# Patient Record
Sex: FEMALE | Race: WHITE | Employment: FULL TIME | URBAN - METROPOLITAN AREA
[De-identification: names, ages, dates, MRNs, and addresses within clinical notes are randomized per-mention and may not be internally consistent; named-entity substitution may affect disease eponyms.]

---

## 2018-04-19 PROCEDURE — 88175 CYTOPATH C/V AUTO FLUID REDO: CPT | Performed by: OBSTETRICS & GYNECOLOGY

## 2018-07-06 ENCOUNTER — OFFICE VISIT (OUTPATIENT)
Dept: INTERNAL MEDICINE CLINIC | Facility: CLINIC | Age: 38
End: 2018-07-06

## 2018-07-06 VITALS
BODY MASS INDEX: 23.5 KG/M2 | DIASTOLIC BLOOD PRESSURE: 66 MMHG | HEART RATE: 86 BPM | HEIGHT: 66.5 IN | TEMPERATURE: 98 F | SYSTOLIC BLOOD PRESSURE: 102 MMHG | WEIGHT: 148 LBS | OXYGEN SATURATION: 98 %

## 2018-07-06 DIAGNOSIS — Z00.00 ANNUAL PHYSICAL EXAM: Primary | ICD-10-CM

## 2018-07-06 DIAGNOSIS — L71.0 PERIORAL DERMATITIS: ICD-10-CM

## 2018-07-06 DIAGNOSIS — R09.81 NASAL CONGESTION: ICD-10-CM

## 2018-07-06 DIAGNOSIS — E65 AXILLARY FAT PAD: ICD-10-CM

## 2018-07-06 PROCEDURE — 99385 PREV VISIT NEW AGE 18-39: CPT | Performed by: INTERNAL MEDICINE

## 2018-07-06 NOTE — PROGRESS NOTES
Spencer Stein is a 41year old female. Patient presents with:  Establish Care: See's gyne for paps last done 4/18/18 with normal result Dr. Emelia Avila had periguard removed today on preventative ABX. Had mirena inserted today.  No former pcp, see's derm for Smokeless tobacco: Never Used                             REVIEW OF SYSTEMS:   GENERAL: feels well otherwise  SKIN: denies any unusual skin lesions  EYES:denies blurred vision or double vision  HEENT: denies nasal congestion, sinus pain, ST, so involved in excision, this could lead to lymphedema, but if it is just excess skin, may be ok do to this. 4. Nasal congestion  Trial of flonase to see if this helps.        Josue Melton DO  7/6/2018  3:25 PM

## 2018-08-13 ENCOUNTER — OFFICE VISIT (OUTPATIENT)
Dept: INTERNAL MEDICINE CLINIC | Facility: CLINIC | Age: 38
End: 2018-08-13
Payer: COMMERCIAL

## 2018-08-13 VITALS
HEIGHT: 66.5 IN | SYSTOLIC BLOOD PRESSURE: 116 MMHG | BODY MASS INDEX: 23.5 KG/M2 | TEMPERATURE: 99 F | WEIGHT: 148 LBS | DIASTOLIC BLOOD PRESSURE: 65 MMHG | OXYGEN SATURATION: 98 % | HEART RATE: 86 BPM

## 2018-08-13 DIAGNOSIS — L03.116 CELLULITIS OF LEFT LOWER EXTREMITY: Primary | ICD-10-CM

## 2018-08-13 PROCEDURE — 99213 OFFICE O/P EST LOW 20 MIN: CPT | Performed by: INTERNAL MEDICINE

## 2018-08-13 PROCEDURE — 99212 OFFICE O/P EST SF 10 MIN: CPT | Performed by: INTERNAL MEDICINE

## 2018-08-13 RX ORDER — DOXYCYCLINE HYCLATE 100 MG/1
100 CAPSULE ORAL 2 TIMES DAILY
Qty: 14 CAPSULE | Refills: 0 | Status: SHIPPED | OUTPATIENT
Start: 2018-08-13 | End: 2019-09-11

## 2018-08-13 NOTE — PROGRESS NOTES
Elfego Merino is a 41year old female. Patient presents with:  Bite Sting,Insect (integumentary): Patient states she got stung by a bee on 08/05/18.       HPI:   Elfego Merino is a 41year old female who presents for: bite/sting    She was stun BMI 23.53 kg/m²     GENERAL: well developed, well nourished, in no apparent distress  Posterior L leg with 3cm area of flat erythematous patch, no blisters, open pore, or induration noted. ASSESSMENT AND PLAN:     1.  Cellulitis of left lower extremity

## 2019-06-05 PROCEDURE — 81003 URINALYSIS AUTO W/O SCOPE: CPT | Performed by: OBSTETRICS & GYNECOLOGY

## 2019-06-05 PROCEDURE — 88175 CYTOPATH C/V AUTO FLUID REDO: CPT | Performed by: OBSTETRICS & GYNECOLOGY

## 2019-09-11 NOTE — PROGRESS NOTES
Tamara Kaminski is a 42year old female. Patient presents with: Anxiety      HPI:   Tamara Kaminski is a 42year old female who presents for: anxiety    Reports worsening anxiety with work.  Reports feeling nervous and an ache in her stomach prior 110/72   Pulse 64   Temp 98.2 °F (36.8 °C) (Oral)   Ht 5' 6\" (1.676 m)   Wt 154 lb (69.9 kg)   BMI 24.86 kg/m²     GENERAL: well developed, well nourished, in no apparent distress  Good eye contact, well groomed. ASSESSMENT AND PLAN:     1.  Anxiety

## 2019-10-07 ENCOUNTER — PATIENT MESSAGE (OUTPATIENT)
Dept: INTERNAL MEDICINE CLINIC | Facility: CLINIC | Age: 39
End: 2019-10-07

## 2019-10-07 NOTE — TELEPHONE ENCOUNTER
From: Jin Cortez  To: Breezy Colmenares DO  Sent: 10/7/2019 9:03 AM CDT  Subject: Visit Faina Gillette,    I went in for a visit last month to chat about feelings of a lack of kenisha, anxiety, etc. You had suggested a half hour every d

## 2019-10-08 RX ORDER — ESCITALOPRAM OXALATE 5 MG/1
5 TABLET ORAL DAILY
Qty: 30 TABLET | Refills: 1 | Status: SHIPPED | OUTPATIENT
Start: 2019-10-08 | End: 2020-09-11

## 2020-09-10 ENCOUNTER — PATIENT MESSAGE (OUTPATIENT)
Dept: INTERNAL MEDICINE CLINIC | Facility: CLINIC | Age: 40
End: 2020-09-10

## 2020-09-10 NOTE — TELEPHONE ENCOUNTER
From: Sarah Noriega  To: Alex Hernadez DO  Sent: 9/10/2020 1:47 PM CDT  Subject: Non-Urgent Medical Question    Attaching another image to go along with my message from earlier this morning.  This is a closer up shot of some of the smaller satellite bu

## 2020-09-11 ENCOUNTER — OFFICE VISIT (OUTPATIENT)
Dept: INTERNAL MEDICINE CLINIC | Facility: CLINIC | Age: 40
End: 2020-09-11
Payer: COMMERCIAL

## 2020-09-11 VITALS
OXYGEN SATURATION: 98 % | RESPIRATION RATE: 14 BRPM | SYSTOLIC BLOOD PRESSURE: 118 MMHG | HEART RATE: 87 BPM | TEMPERATURE: 97 F | DIASTOLIC BLOOD PRESSURE: 78 MMHG | WEIGHT: 155 LBS | BODY MASS INDEX: 25 KG/M2

## 2020-09-11 DIAGNOSIS — B02.9 HERPES ZOSTER WITHOUT COMPLICATION: Primary | ICD-10-CM

## 2020-09-11 PROCEDURE — 3078F DIAST BP <80 MM HG: CPT | Performed by: INTERNAL MEDICINE

## 2020-09-11 PROCEDURE — 3074F SYST BP LT 130 MM HG: CPT | Performed by: INTERNAL MEDICINE

## 2020-09-11 PROCEDURE — 99214 OFFICE O/P EST MOD 30 MIN: CPT | Performed by: INTERNAL MEDICINE

## 2020-09-11 RX ORDER — GABAPENTIN 100 MG/1
100 CAPSULE ORAL NIGHTLY
Qty: 20 CAPSULE | Refills: 0 | Status: SHIPPED | OUTPATIENT
Start: 2020-09-11 | End: 2021-07-30

## 2020-09-11 RX ORDER — VALACYCLOVIR HYDROCHLORIDE 1 G/1
1 TABLET, FILM COATED ORAL 3 TIMES DAILY
Qty: 21 TABLET | Refills: 0 | Status: SHIPPED | OUTPATIENT
Start: 2020-09-11 | End: 2020-09-18

## 2020-09-11 NOTE — PATIENT INSTRUCTIONS
1. Herpes zoster without complication  For the shingles I recommend that we take the Valtrex 3 times a day for 7 days, complete the whole medication regimen here  Remember the precautions on coming in contact with others  Think about keeping the larger pat

## 2020-09-11 NOTE — TELEPHONE ENCOUNTER
Spoke to patient and relayed MD message. Patient verbalized understanding.    Appointment made with you (earliest appt tomorrow among physicians) at 9:30AM.    When screening patient, she mentioned her daughter has a fever and was tested for COVID today, th

## 2020-09-11 NOTE — TELEPHONE ENCOUNTER
Nursing, can you call her, let her know I did send her a my chart message covering for Dr. Shannen Gillette, we would have to see this type of thing in person in order to make a determination on how to treat it if it is getting worse, progressing for sure she should b

## 2020-09-11 NOTE — TELEPHONE ENCOUNTER
As long as she is screened at the front door, you may have to warn her what to expect with the questioning.   When she is asked has she been exposed anyone COVID's, she must say no, pending testing does not count

## 2020-09-11 NOTE — PROGRESS NOTES
HPI:   Joelle York is a 47year old female who presents for complains of: Patient presents with:  Rash: Pt c/o rash on R abdomen that is itchy and burning. Reports sensitive to touch. Denies exposure to poison ivy.  Pt has not tried OTC medications capsule (100 mg total) by mouth nightly. Dispense: 20 capsule;  Refill: [address] [place] [address] [address], DO  9/11/2020  10:07 AM

## 2020-11-16 ENCOUNTER — TELEPHONE (OUTPATIENT)
Dept: INTERNAL MEDICINE CLINIC | Facility: CLINIC | Age: 40
End: 2020-11-16

## 2020-11-16 DIAGNOSIS — R52 BODY ACHES: Primary | ICD-10-CM

## 2020-11-16 NOTE — TELEPHONE ENCOUNTER
Respiratory infection triage:    Fever:  [x]  No fever 98.0ish  []  Fever>100.4    Cough:  [] Tight cough  [] Cough with exertion  [] Dry cough  [] Sputum production, Color:     Breathing:  [] Mild shortness of breath interfering with activity  [] Wheezing  [] Pain with deep breathing  [] Using inhaler    Other symptoms:  [] Sore throat  [] Difficulty swallowing  [x] Nasal drainage/congestion \"post nasal drip\"  [] Sinus congestion/pressure  [] Ear pain  [x] Body aches  [x] Poor appetite  [] Loss of sense of smell   [] Loss of sense of taste  []Conjunctivitis? [] Any recent travel? [] Any sick contacts? [] Are you a healthcare worker? ADDITIONAL NOTES:    Sx onset: last night, worse today. Denies sick contacts or covid exposure. She had family over on Saturday, they spent the night. No mask worn, but no one was or is symptomatic. To Dr. Stephani Cervantes for continuity of care. Please advise. Notified patient that we will route this message to the doctor and see what their recommendations would be. In the meantime, if anything worsens, they were advised to call back or seek emergent evaluation.

## 2020-11-16 NOTE — TELEPHONE ENCOUNTER
Please call pt  Requesting order for COVID test  Pt is achy, tired, chills, no fever  Pt not aware of any exposure  Tasked to nursing

## 2020-11-17 NOTE — TELEPHONE ENCOUNTER
Nursing okay to give her the number for central scheduling to try to set this up as soon as she can and we will call her with results. If she truly think she could have Covid, she is supposed to quarantine until her results are received.

## 2020-11-17 NOTE — TELEPHONE ENCOUNTER
Dr. Mike Navarrete message and Central Scheduling phone number relayed on pt's VM per [facility].

## 2020-11-18 ENCOUNTER — APPOINTMENT (OUTPATIENT)
Dept: LAB | Age: 40
End: 2020-11-18
Attending: INTERNAL MEDICINE
Payer: COMMERCIAL

## 2020-11-18 DIAGNOSIS — R52 BODY ACHES: ICD-10-CM

## 2021-03-12 ENCOUNTER — OFFICE VISIT (OUTPATIENT)
Dept: FAMILY MEDICINE CLINIC | Facility: CLINIC | Age: 41
End: 2021-03-12
Payer: COMMERCIAL

## 2021-03-12 VITALS
TEMPERATURE: 99 F | DIASTOLIC BLOOD PRESSURE: 79 MMHG | HEART RATE: 73 BPM | OXYGEN SATURATION: 98 % | SYSTOLIC BLOOD PRESSURE: 132 MMHG | RESPIRATION RATE: 18 BRPM

## 2021-03-12 DIAGNOSIS — Z20.822 SUSPECTED COVID-19 VIRUS INFECTION: Primary | ICD-10-CM

## 2021-03-12 DIAGNOSIS — Z20.822 CLOSE EXPOSURE TO COVID-19 VIRUS: ICD-10-CM

## 2021-03-12 PROCEDURE — 99202 OFFICE O/P NEW SF 15 MIN: CPT | Performed by: NURSE PRACTITIONER

## 2021-03-12 PROCEDURE — 3078F DIAST BP <80 MM HG: CPT | Performed by: NURSE PRACTITIONER

## 2021-03-12 PROCEDURE — 3075F SYST BP GE 130 - 139MM HG: CPT | Performed by: NURSE PRACTITIONER

## 2021-03-12 NOTE — PROGRESS NOTES
CHIEF COMPLAINT:   Patient presents with:  Covid:  tested positive for covid.   I've had sinus symptoms for 2 days. - Entered by patient      HPI:   Tamika Alston is a 47year old female who presents with symptoms as described below for 2 days Social History    Tobacco Use      Smoking status: Never Smoker      Smokeless tobacco: Never Used    Vaping Use      Vaping Use: Never used    Alcohol use: Yes      Comment: 12 drinks a week    Drug use: No        REVIEW OF SYSTEMS:   GENERAL: good appe not already completed. Advised to self quarantine at home while awaiting result. CDC quarantine recommendations reviewed. Advised a negative COVID test does not guarantee pt is not infectious or contagious. Notify employer/school of testing. for preventing spread of the virus, and managing symptoms. If you think you have COVID-19 symptoms  · Stay home. Call your healthcare provider and tell them you have symptoms of COVID-19. Do this before going to any hospital or clinic.  Follow your provide need to get medical care. Don't go to work, school, or public areas. Don't use public transportation or taxis. · Follow all instructions from your healthcare provider. Call your healthcare provider’s office before going.  They can prepare and give you inst hydrated. Drinking liquids is the best way to prevent dehydration. . Try to drink 6 to 8 glasses of liquids every day, or as advised by your provider. Also check with your provider about which fluids are best for you.  Don't drink fluids that contain caffein blankets. · Clean fabrics and laundry thoroughly. · Keep other people and pets away from the sick person. When you can stop self-isolation  When you are sick with COVID-19, you should stay away from other people. This is called self-isolation.   If you these:  · Trouble breathing  · Pain or pressure in chest  If a sick person has any of these, call 911:  · Trouble breathing that gets worse  · Pain or pressure in chest that gets worse  · Blue tint to lips or face  · Fast or irregular heartbeat  · Confusio

## 2021-03-12 NOTE — PATIENT INSTRUCTIONS
• Covid hotline number is [phone]  • Results for covid testing can vary from 1-2 days  • If you have not received results by 2 days please contact the clinic  • Notify your employer or school of testing  • Remember if you tested negative but were expo mask of your own. You can do this using a bandana, T-shirt, or other cloth. The CDC has instructions on how to make a mask. Wear the mask so that it covers both your nose and mouth.   · If you need to go to a hospital or clinic, expect that the healthcare s like eating and drinking utensils, towels, and bedding. · If you need to cough or sneeze, do it into a tissue. Then throw the tissue into the trash. If you don't have tissues, cough or sneeze into the bend of your elbow. · Wash your hands often.     Self- asked the [employer] to help with plasma donation and collection. Caring for a sick person   · Follow all instructions from healthcare staff. · Wash your hands often. · Wear protective clothing as advised.   · Make sure the sick person wears a ma system disorders. Follow your healthcare provider's instructions on how to isolate and when it's OK to stop. You likely will be told to stay in home isolation until all 3 of these are true:  1. You have no fever without fever-reducing medicines.   2. Your b

## 2021-03-13 LAB — SARS-COV-2 RNA RESP QL NAA+PROBE: DETECTED

## 2021-03-17 ENCOUNTER — TELEPHONE (OUTPATIENT)
Dept: INTERNAL MEDICINE CLINIC | Facility: CLINIC | Age: 41
End: 2021-03-17

## 2021-05-03 ENCOUNTER — TELEPHONE (OUTPATIENT)
Dept: INTERNAL MEDICINE CLINIC | Facility: CLINIC | Age: 41
End: 2021-05-03

## 2021-05-03 NOTE — TELEPHONE ENCOUNTER
LMOM for pt. To call back and schedule her annual [facility] with Dr. Aleah Gore.

## 2021-11-24 ENCOUNTER — OFFICE VISIT (OUTPATIENT)
Dept: INTERNAL MEDICINE CLINIC | Facility: CLINIC | Age: 41
End: 2021-11-24
Payer: COMMERCIAL

## 2021-11-24 VITALS
HEART RATE: 82 BPM | SYSTOLIC BLOOD PRESSURE: 118 MMHG | BODY MASS INDEX: 25.14 KG/M2 | HEIGHT: 66.5 IN | DIASTOLIC BLOOD PRESSURE: 76 MMHG | TEMPERATURE: 98 F | OXYGEN SATURATION: 98 % | RESPIRATION RATE: 16 BRPM | WEIGHT: 158.31 LBS

## 2021-11-24 DIAGNOSIS — Z00.00 ANNUAL PHYSICAL EXAM: Primary | ICD-10-CM

## 2021-11-24 DIAGNOSIS — R20.2 RIGHT HAND PARESTHESIA: ICD-10-CM

## 2021-11-24 DIAGNOSIS — E53.8 B12 DEFICIENCY: ICD-10-CM

## 2021-11-24 DIAGNOSIS — G43.809 OTHER MIGRAINE WITHOUT STATUS MIGRAINOSUS, NOT INTRACTABLE: ICD-10-CM

## 2021-11-24 PROCEDURE — 99395 PREV VISIT EST AGE 18-39: CPT | Performed by: INTERNAL MEDICINE

## 2021-11-24 PROCEDURE — 3074F SYST BP LT 130 MM HG: CPT | Performed by: INTERNAL MEDICINE

## 2021-11-24 PROCEDURE — 99213 OFFICE O/P EST LOW 20 MIN: CPT | Performed by: INTERNAL MEDICINE

## 2021-11-24 PROCEDURE — 3078F DIAST BP <80 MM HG: CPT | Performed by: INTERNAL MEDICINE

## 2021-11-24 PROCEDURE — 3008F BODY MASS INDEX DOCD: CPT | Performed by: INTERNAL MEDICINE

## 2021-11-24 RX ORDER — MULTIVITAMIN
TABLET ORAL
COMMUNITY

## 2021-11-24 RX ORDER — CHOLECALCIFEROL (VITAMIN D3) 25 MCG
1 TABLET,CHEWABLE ORAL DAILY
Qty: 90 CAPSULE | Refills: 1 | Status: SHIPPED | OUTPATIENT
Start: 2021-11-24

## 2021-11-24 NOTE — PROGRESS NOTES
Vicky Dodd is a 46year old female. Patient presents with:  Physical: Annual, Pap 4/2021      HPI:   Vicky Dodd is a 46year old female who presents for a complete physical exam.      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 +RF  Pt has no symptoms; noted on annual blood work through employer    4. b12 deficiency  Advised b12 supplementation daily; repeat levels in 2 months, if still low will need injections    5.  Migraines  Given migraine handout-keep diary

## 2022-01-19 ENCOUNTER — LAB ENCOUNTER (OUTPATIENT)
Dept: LAB | Age: 42
End: 2022-01-19
Attending: INTERNAL MEDICINE
Payer: COMMERCIAL

## 2022-01-19 DIAGNOSIS — E53.8 B12 DEFICIENCY: ICD-10-CM

## 2022-01-19 LAB — VIT B12 SERPL-MCNC: 678 PG/ML (ref 193–986)

## 2022-01-19 PROCEDURE — 82607 VITAMIN B-12: CPT

## 2022-01-19 PROCEDURE — 36415 COLL VENOUS BLD VENIPUNCTURE: CPT

## 2022-04-06 ENCOUNTER — OFFICE VISIT (OUTPATIENT)
Dept: INTERNAL MEDICINE CLINIC | Facility: CLINIC | Age: 42
End: 2022-04-06
Payer: COMMERCIAL

## 2022-04-06 VITALS
HEART RATE: 108 BPM | DIASTOLIC BLOOD PRESSURE: 80 MMHG | TEMPERATURE: 98 F | WEIGHT: 155 LBS | OXYGEN SATURATION: 97 % | BODY MASS INDEX: 24.62 KG/M2 | SYSTOLIC BLOOD PRESSURE: 114 MMHG | HEIGHT: 66.5 IN

## 2022-04-06 DIAGNOSIS — L73.1 INGROWN HAIR: Primary | ICD-10-CM

## 2022-04-06 PROCEDURE — 3008F BODY MASS INDEX DOCD: CPT | Performed by: INTERNAL MEDICINE

## 2022-04-06 PROCEDURE — 3079F DIAST BP 80-89 MM HG: CPT | Performed by: INTERNAL MEDICINE

## 2022-04-06 PROCEDURE — 3074F SYST BP LT 130 MM HG: CPT | Performed by: INTERNAL MEDICINE

## 2022-04-06 PROCEDURE — 99213 OFFICE O/P EST LOW 20 MIN: CPT | Performed by: INTERNAL MEDICINE

## 2022-04-06 RX ORDER — CEPHALEXIN 250 MG/1
250 CAPSULE ORAL 4 TIMES DAILY
Qty: 28 CAPSULE | Refills: 0 | Status: SHIPPED | OUTPATIENT
Start: 2022-04-06

## 2022-04-18 ENCOUNTER — PATIENT MESSAGE (OUTPATIENT)
Dept: INTERNAL MEDICINE CLINIC | Facility: CLINIC | Age: 42
End: 2022-04-18

## 2022-04-19 ENCOUNTER — TELEPHONE (OUTPATIENT)
Dept: INTERNAL MEDICINE CLINIC | Facility: CLINIC | Age: 42
End: 2022-04-19

## 2022-04-19 RX ORDER — FLUCONAZOLE 150 MG/1
150 TABLET ORAL ONCE
Qty: 1 TABLET | Refills: 1 | Status: SHIPPED | OUTPATIENT
Start: 2022-04-19 | End: 2022-04-19

## 2022-05-29 ENCOUNTER — IMMUNIZATION (OUTPATIENT)
Dept: LAB | Age: 42
End: 2022-05-29
Attending: EMERGENCY MEDICINE
Payer: COMMERCIAL

## 2022-05-29 DIAGNOSIS — Z23 NEED FOR VACCINATION: Primary | ICD-10-CM

## 2022-05-29 PROCEDURE — 0064A SARSCOV2 VAC 50MCG/0.25ML IM: CPT

## 2022-07-09 ENCOUNTER — HOSPITAL ENCOUNTER (EMERGENCY)
Facility: HOSPITAL | Age: 42
Discharge: HOME | End: 2022-07-09
Attending: EMERGENCY MEDICINE
Payer: COMMERCIAL

## 2022-07-09 ENCOUNTER — APPOINTMENT (OUTPATIENT)
Dept: GENERAL RADIOLOGY | Facility: HOSPITAL | Age: 42
End: 2022-07-09
Attending: EMERGENCY MEDICINE
Payer: COMMERCIAL

## 2022-07-09 VITALS
DIASTOLIC BLOOD PRESSURE: 70 MMHG | TEMPERATURE: 98 F | OXYGEN SATURATION: 98 % | RESPIRATION RATE: 16 BRPM | HEART RATE: 84 BPM | SYSTOLIC BLOOD PRESSURE: 119 MMHG

## 2022-07-09 DIAGNOSIS — S02.5XXA CLOSED FRACTURE OF TOOTH, INITIAL ENCOUNTER: ICD-10-CM

## 2022-07-09 DIAGNOSIS — S01.511A LIP LACERATION, INITIAL ENCOUNTER: ICD-10-CM

## 2022-07-09 DIAGNOSIS — S00.31XA ABRASION OF NOSE, INITIAL ENCOUNTER: Primary | ICD-10-CM

## 2022-07-09 PROCEDURE — 12011 RPR F/E/E/N/L/M 2.5 CM/<: CPT

## 2022-07-09 PROCEDURE — 99283 EMERGENCY DEPT VISIT LOW MDM: CPT

## 2022-07-09 PROCEDURE — 70160 X-RAY EXAM OF NASAL BONES: CPT | Performed by: EMERGENCY MEDICINE
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
## 2022-07-11 ENCOUNTER — OFFICE VISIT (OUTPATIENT)
Dept: INTERNAL MEDICINE CLINIC | Facility: CLINIC | Age: 42
End: 2022-07-11
Payer: COMMERCIAL

## 2022-07-11 VITALS
OXYGEN SATURATION: 98 % | DIASTOLIC BLOOD PRESSURE: 78 MMHG | HEART RATE: 98 BPM | BODY MASS INDEX: 24.53 KG/M2 | SYSTOLIC BLOOD PRESSURE: 136 MMHG | TEMPERATURE: 98 F | HEIGHT: 66 IN | WEIGHT: 152.63 LBS
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
## 2022-07-11 DIAGNOSIS — S01.511D LIP LACERATION, SUBSEQUENT ENCOUNTER: Primary | ICD-10-CM

## 2022-07-11 PROCEDURE — 3078F DIAST BP <80 MM HG: CPT | Performed by: INTERNAL MEDICINE

## 2022-07-11 PROCEDURE — 3008F BODY MASS INDEX DOCD: CPT | Performed by: INTERNAL MEDICINE

## 2022-07-11 PROCEDURE — 3075F SYST BP GE 130 - 139MM HG: CPT | Performed by: INTERNAL MEDICINE

## 2022-07-11 PROCEDURE — 99214 OFFICE O/P EST MOD 30 MIN: CPT | Performed by: INTERNAL MEDICINE

## 2022-07-11 RX ORDER — METHYLPREDNISOLONE 4 MG/1
TABLET ORAL
Qty: 1 EACH | Refills: 0 | Status: SHIPPED | OUTPATIENT
Start: 2022-07-11

## 2022-07-18 ENCOUNTER — OFFICE VISIT (OUTPATIENT)
Dept: INTERNAL MEDICINE CLINIC | Facility: CLINIC | Age: 42
End: 2022-07-18
Payer: COMMERCIAL

## 2022-07-18 VITALS
HEIGHT: 66 IN | WEIGHT: 150 LBS | BODY MASS INDEX: 24.11 KG/M2 | OXYGEN SATURATION: 96 % | SYSTOLIC BLOOD PRESSURE: 118 MMHG | DIASTOLIC BLOOD PRESSURE: 70 MMHG | HEART RATE: 90 BPM | TEMPERATURE: 99 F

## 2022-07-18 DIAGNOSIS — S01.511D LIP LACERATION, SUBSEQUENT ENCOUNTER: Primary | ICD-10-CM

## 2022-07-18 PROCEDURE — 3008F BODY MASS INDEX DOCD: CPT | Performed by: INTERNAL MEDICINE

## 2022-07-18 PROCEDURE — 99213 OFFICE O/P EST LOW 20 MIN: CPT | Performed by: INTERNAL MEDICINE

## 2022-07-18 PROCEDURE — 3078F DIAST BP <80 MM HG: CPT | Performed by: INTERNAL MEDICINE

## 2022-07-18 PROCEDURE — 3074F SYST BP LT 130 MM HG: CPT | Performed by: INTERNAL MEDICINE

## 2022-12-21 ENCOUNTER — OFFICE VISIT (OUTPATIENT)
Dept: INTERNAL MEDICINE CLINIC | Facility: CLINIC | Age: 42
End: 2022-12-21
Payer: COMMERCIAL

## 2022-12-21 VITALS
SYSTOLIC BLOOD PRESSURE: 120 MMHG | TEMPERATURE: 99 F | OXYGEN SATURATION: 98 % | DIASTOLIC BLOOD PRESSURE: 82 MMHG | BODY MASS INDEX: 24.27 KG/M2 | WEIGHT: 151 LBS | HEIGHT: 66 IN | HEART RATE: 94 BPM

## 2022-12-21 DIAGNOSIS — J18.9 COMMUNITY ACQUIRED PNEUMONIA OF RIGHT LOWER LOBE OF LUNG: Primary | ICD-10-CM

## 2022-12-21 PROCEDURE — 3074F SYST BP LT 130 MM HG: CPT | Performed by: INTERNAL MEDICINE

## 2022-12-21 PROCEDURE — 99213 OFFICE O/P EST LOW 20 MIN: CPT | Performed by: INTERNAL MEDICINE

## 2022-12-21 PROCEDURE — 3079F DIAST BP 80-89 MM HG: CPT | Performed by: INTERNAL MEDICINE

## 2022-12-21 PROCEDURE — 3008F BODY MASS INDEX DOCD: CPT | Performed by: INTERNAL MEDICINE

## 2022-12-21 RX ORDER — CEFDINIR 300 MG/1
300 CAPSULE ORAL 2 TIMES DAILY
Qty: 14 CAPSULE | Refills: 0 | Status: SHIPPED | OUTPATIENT
Start: 2022-12-21

## 2022-12-21 RX ORDER — IVERMECTIN 10 MG/G
CREAM TOPICAL
COMMUNITY
Start: 2022-11-22

## 2022-12-21 RX ORDER — DOXYCYCLINE 100 MG/1
CAPSULE ORAL
COMMUNITY
Start: 2022-12-15

## 2023-05-04 ENCOUNTER — HOSPITAL ENCOUNTER (OUTPATIENT)
Age: 43
Discharge: HOME | End: 2023-05-04
Payer: COMMERCIAL

## 2023-05-04 ENCOUNTER — TELEPHONE (OUTPATIENT)
Dept: INTERNAL MEDICINE CLINIC | Facility: CLINIC | Age: 43
End: 2023-05-04

## 2023-05-04 VITALS
RESPIRATION RATE: 18 BRPM | SYSTOLIC BLOOD PRESSURE: 138 MMHG | WEIGHT: 148 LBS | DIASTOLIC BLOOD PRESSURE: 76 MMHG | HEART RATE: 78 BPM | BODY MASS INDEX: 23.78 KG/M2 | TEMPERATURE: 99 F | HEIGHT: 66 IN | OXYGEN SATURATION: 99 %

## 2023-05-04 DIAGNOSIS — R20.0 NUMBNESS AND TINGLING: Primary | ICD-10-CM

## 2023-05-04 DIAGNOSIS — R20.2 NUMBNESS AND TINGLING: Primary | ICD-10-CM

## 2023-05-04 LAB
#MXD IC: 0.7 X10ˆ3/UL (ref 0.1–1)
BUN BLD-MCNC: 18 MG/DL (ref 7–18)
CHLORIDE BLD-SCNC: 99 MMOL/L (ref 98–112)
CO2 BLD-SCNC: 25 MMOL/L (ref 21–32)
CREAT BLD-MCNC: 1 MG/DL
GFR SERPLBLD BASED ON 1.73 SQ M-ARVRAT: 73 ML/MIN/1.73M2 (ref 60–?)
GLUCOSE BLD-MCNC: 92 MG/DL (ref 70–99)
HCT VFR BLD AUTO: 43.6 %
HCT VFR BLD CALC: 48 %
HGB BLD-MCNC: 14.8 G/DL
ISTAT IONIZED CALCIUM FOR CHEM 8: 1.19 MMOL/L (ref 1.12–1.32)
LYMPHOCYTES # BLD AUTO: 1.7 X10ˆ3/UL (ref 1–4)
LYMPHOCYTES NFR BLD AUTO: 26.9 %
MCH RBC QN AUTO: 34.5 PG (ref 26–34)
MCHC RBC AUTO-ENTMCNC: 33.9 G/DL (ref 31–37)
MCV RBC AUTO: 101.6 FL (ref 80–100)
MIXED CELL %: 11.3 %
NEUTROPHILS # BLD AUTO: 3.9 X10ˆ3/UL (ref 1.5–7.7)
NEUTROPHILS NFR BLD AUTO: 61.8 %
PLATELET # BLD AUTO: 198 X10ˆ3/UL (ref 150–450)
POTASSIUM BLD-SCNC: 4.2 MMOL/L (ref 3.6–5.1)
RBC # BLD AUTO: 4.29 X10ˆ6/UL
SODIUM BLD-SCNC: 135 MMOL/L (ref 136–145)
WBC # BLD AUTO: 6.3 X10ˆ3/UL (ref 4–11)

## 2023-05-04 PROCEDURE — 36415 COLL VENOUS BLD VENIPUNCTURE: CPT | Performed by: NURSE PRACTITIONER

## 2023-05-04 PROCEDURE — 85025 COMPLETE CBC W/AUTO DIFF WBC: CPT | Performed by: NURSE PRACTITIONER

## 2023-05-04 PROCEDURE — 80047 BASIC METABLC PNL IONIZED CA: CPT | Performed by: NURSE PRACTITIONER

## 2023-05-04 PROCEDURE — 99213 OFFICE O/P EST LOW 20 MIN: CPT | Performed by: NURSE PRACTITIONER

## 2023-05-04 NOTE — ED INITIAL ASSESSMENT (HPI)
Pt reports bilateral arm tingling/numbness x about 1 week. Denies injury or trauma, does work out consistently though. Denies chest pain or SOB.

## 2023-05-04 NOTE — DISCHARGE INSTRUCTIONS
Please call neurology to schedule a follow-up appointment. Any worsening symptoms please go to the emergency department.

## 2023-05-04 NOTE — TELEPHONE ENCOUNTER
Spoke with pt, states a few weeks ago she \"tweaked\" her right shoulder during an exercise and it is possibly injured. Reports she has had a dull achy sensation since then. Pt was not evaluated and continued with exercises (circuit, HIIT, planks, push ups, cardio). Reports 5 days ago during her workout, she noticed mild tingling in fingertips on the right and left hand. Tingling is mild and intermittent. Reports when she exercises, particularly when she is jumping rope, she has a \"tingling sensation\" that travels up her arm. Denies numbness, weakness, skin changes, temperature changes. Denies pain. Reports the dull ache is still in her right shoulder. Denies back pain. Pt advised to be evaluated in UC. Pt is agreeable and will go today. Nursing to F/U.

## 2023-05-04 NOTE — TELEPHONE ENCOUNTER
Please call patient  She has been experiencing numbness in both arms for last 5 days  Please advise  Tasked to nursing

## 2023-05-05 NOTE — TELEPHONE ENCOUNTER
Per chart review, pt seen in  5/4. Advised f/u with Dr. Tyrone Booker (Neurology). complains of pain/discomfort

## 2023-05-15 ENCOUNTER — OFFICE VISIT (OUTPATIENT)
Dept: INTERNAL MEDICINE CLINIC | Facility: CLINIC | Age: 43
End: 2023-05-15

## 2023-05-15 ENCOUNTER — TELEPHONE (OUTPATIENT)
Dept: INTERNAL MEDICINE CLINIC | Facility: CLINIC | Age: 43
End: 2023-05-15

## 2023-05-15 ENCOUNTER — LAB ENCOUNTER (OUTPATIENT)
Dept: LAB | Age: 43
End: 2023-05-15
Attending: INTERNAL MEDICINE
Payer: COMMERCIAL

## 2023-05-15 VITALS
BODY MASS INDEX: 24.91 KG/M2 | HEIGHT: 66 IN | SYSTOLIC BLOOD PRESSURE: 120 MMHG | WEIGHT: 155 LBS | HEART RATE: 64 BPM | TEMPERATURE: 99 F | DIASTOLIC BLOOD PRESSURE: 70 MMHG

## 2023-05-15 DIAGNOSIS — R92.2 DENSE BREAST: ICD-10-CM

## 2023-05-15 DIAGNOSIS — Z12.31 ENCOUNTER FOR SCREENING MAMMOGRAM FOR MALIGNANT NEOPLASM OF BREAST: Primary | ICD-10-CM

## 2023-05-15 DIAGNOSIS — R20.2 PARESTHESIAS: Primary | ICD-10-CM

## 2023-05-15 DIAGNOSIS — Z00.00 ANNUAL PHYSICAL EXAM: ICD-10-CM

## 2023-05-15 LAB
ALBUMIN SERPL-MCNC: 4.6 G/DL (ref 3.4–5)
ALBUMIN/GLOB SERPL: 1.5 {RATIO} (ref 1–2)
ALP LIVER SERPL-CCNC: 36 U/L
ALT SERPL-CCNC: 45 U/L
ANION GAP SERPL CALC-SCNC: 9 MMOL/L (ref 0–18)
AST SERPL-CCNC: 36 U/L (ref 15–37)
BASOPHILS # BLD AUTO: 0.05 X10(3) UL (ref 0–0.2)
BASOPHILS NFR BLD AUTO: 0.7 %
BILIRUB SERPL-MCNC: 0.8 MG/DL (ref 0.1–2)
BUN BLD-MCNC: 12 MG/DL (ref 7–18)
BUN/CREAT SERPL: 15.8 (ref 10–20)
CALCIUM BLD-MCNC: 9.7 MG/DL (ref 8.5–10.1)
CHLORIDE SERPL-SCNC: 105 MMOL/L (ref 98–112)
CHOLEST SERPL-MCNC: 218 MG/DL (ref ?–200)
CO2 SERPL-SCNC: 24 MMOL/L (ref 21–32)
CREAT BLD-MCNC: 0.76 MG/DL
DEPRECATED RDW RBC AUTO: 45.1 FL (ref 35.1–46.3)
EOSINOPHIL # BLD AUTO: 0.03 X10(3) UL (ref 0–0.7)
EOSINOPHIL NFR BLD AUTO: 0.4 %
ERYTHROCYTE [DISTWIDTH] IN BLOOD BY AUTOMATED COUNT: 11.9 % (ref 11–15)
FASTING PATIENT LIPID ANSWER: YES
FASTING STATUS PATIENT QL REPORTED: YES
GFR SERPLBLD BASED ON 1.73 SQ M-ARVRAT: 101 ML/MIN/1.73M2 (ref 60–?)
GLOBULIN PLAS-MCNC: 3.1 G/DL (ref 2.8–4.4)
GLUCOSE BLD-MCNC: 106 MG/DL (ref 70–99)
HCT VFR BLD AUTO: 44.7 %
HDLC SERPL-MCNC: 119 MG/DL (ref 40–59)
HGB BLD-MCNC: 15.1 G/DL
IMM GRANULOCYTES # BLD AUTO: 0.02 X10(3) UL (ref 0–1)
IMM GRANULOCYTES NFR BLD: 0.3 %
LDLC SERPL CALC-MCNC: 91 MG/DL (ref ?–100)
LYMPHOCYTES # BLD AUTO: 1.97 X10(3) UL (ref 1–4)
LYMPHOCYTES NFR BLD AUTO: 27.4 %
MCH RBC QN AUTO: 34.7 PG (ref 26–34)
MCHC RBC AUTO-ENTMCNC: 33.8 G/DL (ref 31–37)
MCV RBC AUTO: 102.8 FL
MONOCYTES # BLD AUTO: 0.48 X10(3) UL (ref 0.1–1)
MONOCYTES NFR BLD AUTO: 6.7 %
NEUTROPHILS # BLD AUTO: 4.65 X10 (3) UL (ref 1.5–7.7)
NEUTROPHILS # BLD AUTO: 4.65 X10(3) UL (ref 1.5–7.7)
NEUTROPHILS NFR BLD AUTO: 64.5 %
NONHDLC SERPL-MCNC: 99 MG/DL (ref ?–130)
OSMOLALITY SERPL CALC.SUM OF ELEC: 286 MOSM/KG (ref 275–295)
PLATELET # BLD AUTO: 189 10(3)UL (ref 150–450)
POTASSIUM SERPL-SCNC: 4.2 MMOL/L (ref 3.5–5.1)
PROT SERPL-MCNC: 7.7 G/DL (ref 6.4–8.2)
RBC # BLD AUTO: 4.35 X10(6)UL
SODIUM SERPL-SCNC: 138 MMOL/L (ref 136–145)
TRIGL SERPL-MCNC: 44 MG/DL (ref 30–149)
TSI SER-ACNC: 1.57 MIU/ML (ref 0.36–3.74)
VIT B12 SERPL-MCNC: 408 PG/ML (ref 193–986)
VIT D+METAB SERPL-MCNC: 12.9 NG/ML (ref 30–100)
VLDLC SERPL CALC-MCNC: 7 MG/DL (ref 0–30)
WBC # BLD AUTO: 7.2 X10(3) UL (ref 4–11)

## 2023-05-15 PROCEDURE — 3078F DIAST BP <80 MM HG: CPT | Performed by: INTERNAL MEDICINE

## 2023-05-15 PROCEDURE — 36415 COLL VENOUS BLD VENIPUNCTURE: CPT

## 2023-05-15 PROCEDURE — 84443 ASSAY THYROID STIM HORMONE: CPT

## 2023-05-15 PROCEDURE — 3008F BODY MASS INDEX DOCD: CPT | Performed by: INTERNAL MEDICINE

## 2023-05-15 PROCEDURE — 80061 LIPID PANEL: CPT

## 2023-05-15 PROCEDURE — 80053 COMPREHEN METABOLIC PANEL: CPT

## 2023-05-15 PROCEDURE — 85025 COMPLETE CBC W/AUTO DIFF WBC: CPT

## 2023-05-15 PROCEDURE — 82607 VITAMIN B-12: CPT

## 2023-05-15 PROCEDURE — 99214 OFFICE O/P EST MOD 30 MIN: CPT | Performed by: INTERNAL MEDICINE

## 2023-05-15 PROCEDURE — 3074F SYST BP LT 130 MM HG: CPT | Performed by: INTERNAL MEDICINE

## 2023-05-15 PROCEDURE — 82306 VITAMIN D 25 HYDROXY: CPT

## 2023-05-17 ENCOUNTER — TELEPHONE (OUTPATIENT)
Dept: INTERNAL MEDICINE CLINIC | Facility: CLINIC | Age: 43
End: 2023-05-17

## 2023-05-17 DIAGNOSIS — E53.8 B12 DEFICIENCY: Primary | ICD-10-CM

## 2023-05-18 RX ORDER — CYANOCOBALAMIN 1000 UG/ML
1000 INJECTION, SOLUTION INTRAMUSCULAR; SUBCUTANEOUS ONCE
Status: COMPLETED | OUTPATIENT
Start: 2023-05-18 | End: 2023-05-22

## 2023-05-18 RX ORDER — SYRINGE W-NEEDLE,DISPOSAB,3 ML 25GX5/8"
SYRINGE, EMPTY DISPOSABLE MISCELLANEOUS
Qty: 50 EACH | Refills: 0 | Status: SHIPPED | OUTPATIENT
Start: 2023-05-18

## 2023-05-18 RX ORDER — ERGOCALCIFEROL 1.25 MG/1
50000 CAPSULE ORAL WEEKLY
Qty: 12 CAPSULE | Refills: 0 | Status: SHIPPED | OUTPATIENT
Start: 2023-05-18 | End: 2023-06-17

## 2023-05-18 RX ORDER — CYANOCOBALAMIN 1000 UG/ML
INJECTION, SOLUTION INTRAMUSCULAR; SUBCUTANEOUS
Qty: 16 ML | Refills: 0 | Status: SHIPPED | OUTPATIENT
Start: 2023-05-18

## 2023-05-18 NOTE — TELEPHONE ENCOUNTER
Please notify pt that overall blood work was ok---her b12 and vitamin d levels are low    For the vitamin d---I am prescribing a high dose vitamin D that she will take weekly for 12 weeks. After that, she should take vitamin D3 2000 units daily. For the l53---pht is on the low end of normal. With her symptoms, I would recommend that she do injections. Please schedule her nurse visit for teaching so she can do them at home. I would start with once weekly x 4 weeks, and then monthly.    (nursing please send in the appropriate syringes for this--sorry I can't remember)

## 2023-05-18 NOTE — TELEPHONE ENCOUNTER
Spoke with patient relayed physician message below. Patient verbalized understanding. Syringes order.     To [facility] office please assist patient with nurse visit for b12 injection and teaching for home administration of b12

## 2023-05-22 ENCOUNTER — NURSE ONLY (OUTPATIENT)
Dept: INTERNAL MEDICINE CLINIC | Facility: CLINIC | Age: 43
End: 2023-05-22

## 2023-05-22 RX ADMIN — CYANOCOBALAMIN 1000 MCG: 1000 INJECTION, SOLUTION INTRAMUSCULAR; SUBCUTANEOUS at 09:18:00

## 2023-05-22 NOTE — PROGRESS NOTES
Pt presented for Vitamin B12 injection and teaching for self administration. Name and  verified. Handouts provided to patient regarding self administration of IM medications as well as handouts on appropriate injection sites (thigh and deltoid). Reviewed how to locate thigh injection site for self administration as well as deltoid site if administered by another person. Pt observed RN draw up practice medication and inject into practice injection pad. Pt able to teach back and perform practice injection into the injection pad. Pt able to self administer B12 injection into right thigh with RN supervision. Educated on safe disposal of sharps. Pt verbalized understanding of above teaching and denies any further questions or concerns at this time.

## 2023-06-15 ENCOUNTER — HOSPITAL ENCOUNTER (OUTPATIENT)
Dept: MAMMOGRAPHY | Age: 43
Discharge: HOME | End: 2023-06-15
Attending: INTERNAL MEDICINE
Payer: COMMERCIAL

## 2023-06-15 DIAGNOSIS — R92.2 DENSE BREAST: ICD-10-CM

## 2023-06-15 DIAGNOSIS — Z12.31 ENCOUNTER FOR SCREENING MAMMOGRAM FOR MALIGNANT NEOPLASM OF BREAST: ICD-10-CM

## 2023-06-15 PROCEDURE — 77067 SCR MAMMO BI INCL CAD: CPT | Performed by: INTERNAL MEDICINE

## 2023-06-15 PROCEDURE — 77063 BREAST TOMOSYNTHESIS BI: CPT | Performed by: INTERNAL MEDICINE

## 2023-06-22 ENCOUNTER — TELEPHONE (OUTPATIENT)
Dept: INTERNAL MEDICINE CLINIC | Facility: CLINIC | Age: 43
End: 2023-06-22

## 2023-06-22 DIAGNOSIS — R92.8 ABNORMAL MAMMOGRAM: Primary | ICD-10-CM

## 2023-06-22 NOTE — TELEPHONE ENCOUNTER
Please notify pt that her mammogram is showing asymmetry in the R breast (no mass noted), but radiology would like to compare with prior mammograms if she has any done.     -if she has had prior mammograms--can she try to get a cd of her prior mammogram and have it sent to [facility] radiology department  -if she has not had prior mammograms, then I would like her to schedule a R breast diagnostic mammogram to evaluate this area further

## 2023-06-23 NOTE — TELEPHONE ENCOUNTER
Spoke to patient and relayed MD message. Will contact [facility] and have Radiologu dept mail CD to [place] [id number] Radiology / Medical Records Dept. Pt verbalized understanding and agrees with plan.

## 2023-07-05 ENCOUNTER — HOSPITAL ENCOUNTER (OUTPATIENT)
Dept: MAMMOGRAPHY | Facility: HOSPITAL | Age: 43
Discharge: HOME | End: 2023-07-05
Attending: INTERNAL MEDICINE
Payer: COMMERCIAL

## 2023-07-05 ENCOUNTER — HOSPITAL ENCOUNTER (OUTPATIENT)
Dept: ULTRASOUND IMAGING | Facility: HOSPITAL | Age: 43
Discharge: HOME | End: 2023-07-05
Attending: INTERNAL MEDICINE
Payer: COMMERCIAL

## 2023-07-05 DIAGNOSIS — R92.8 ABNORMAL MAMMOGRAM: ICD-10-CM

## 2023-07-05 PROCEDURE — 77061 BREAST TOMOSYNTHESIS UNI: CPT | Performed by: INTERNAL MEDICINE

## 2023-07-05 PROCEDURE — 76642 ULTRASOUND BREAST LIMITED: CPT | Performed by: INTERNAL MEDICINE

## 2023-07-05 PROCEDURE — 77065 DX MAMMO INCL CAD UNI: CPT | Performed by: INTERNAL MEDICINE

## 2023-08-07 ENCOUNTER — TELEPHONE (OUTPATIENT)
Dept: INTERNAL MEDICINE CLINIC | Facility: CLINIC | Age: 43
End: 2023-08-07

## 2023-08-07 NOTE — TELEPHONE ENCOUNTER
----- Message from Nixon. Kevin Booker sent at 8/5/2023  5:41 PM CDT -----  Regarding: Follow up  Contact: [phone]  Hi Dr. Hung Shell run through the 12 week supply of vitamin D and have one more dose of the b12 injection that I will be doing mid-August.  Just wondering what I should be doing now. Thanks!   Roland Galdamez

## 2023-08-08 ENCOUNTER — LAB ENCOUNTER (OUTPATIENT)
Dept: LAB | Facility: HOSPITAL | Age: 43
End: 2023-08-08
Attending: Other
Payer: COMMERCIAL

## 2023-08-08 ENCOUNTER — OFFICE VISIT (OUTPATIENT)
Dept: NEUROLOGY | Facility: CLINIC | Age: 43
End: 2023-08-08
Payer: COMMERCIAL

## 2023-08-08 VITALS
HEART RATE: 71 BPM | BODY MASS INDEX: 24.91 KG/M2 | HEIGHT: 66 IN | WEIGHT: 155 LBS | DIASTOLIC BLOOD PRESSURE: 86 MMHG | SYSTOLIC BLOOD PRESSURE: 131 MMHG

## 2023-08-08 DIAGNOSIS — R29.2 HYPERREFLEXIA: ICD-10-CM

## 2023-08-08 DIAGNOSIS — R20.2 PARESTHESIAS: Primary | ICD-10-CM

## 2023-08-08 LAB
EST. AVERAGE GLUCOSE BLD GHB EST-MCNC: 94 MG/DL (ref 68–126)
HBA1C MFR BLD: 4.9 % (ref ?–5.7)

## 2023-08-08 PROCEDURE — 86225 DNA ANTIBODY NATIVE: CPT | Performed by: OTHER

## 2023-08-08 PROCEDURE — 84425 ASSAY OF VITAMIN B-1: CPT | Performed by: OTHER

## 2023-08-08 PROCEDURE — 83921 ORGANIC ACID SINGLE QUANT: CPT | Performed by: OTHER

## 2023-08-08 PROCEDURE — 84446 ASSAY OF VITAMIN E: CPT | Performed by: OTHER

## 2023-08-08 PROCEDURE — 82390 ASSAY OF CERULOPLASMIN: CPT | Performed by: OTHER

## 2023-08-08 PROCEDURE — 3008F BODY MASS INDEX DOCD: CPT | Performed by: OTHER

## 2023-08-08 PROCEDURE — 99204 OFFICE O/P NEW MOD 45 MIN: CPT | Performed by: OTHER

## 2023-08-08 PROCEDURE — 84207 ASSAY OF VITAMIN B-6: CPT | Performed by: OTHER

## 2023-08-08 PROCEDURE — 83036 HEMOGLOBIN GLYCOSYLATED A1C: CPT | Performed by: OTHER

## 2023-08-08 PROCEDURE — 3079F DIAST BP 80-89 MM HG: CPT | Performed by: OTHER

## 2023-08-08 PROCEDURE — 86038 ANTINUCLEAR ANTIBODIES: CPT | Performed by: OTHER

## 2023-08-08 PROCEDURE — 3075F SYST BP GE 130 - 139MM HG: CPT | Performed by: OTHER

## 2023-08-08 NOTE — PATIENT INSTRUCTIONS
-Follow up in 6 weeks or sooner if needed. -If you develop sudden onset loss of vision, double vision, room spinning/world spinning sensation, inability to speak or understand others who are speaking to you, slurred speech, balance problems, weakness on one side of your body, numbness on one side of your body or worst headache you ever had, please seek out emergency medical attention via 911 for the nearest emergency room to be evaluated for possible stroke. -MyChart or call office with any questions or concerns. Thank you for coming to see me today. The easiest way to communicate with me is via Lockstreamhart. Please ask the schedulers to give you an activation code. You can also find the activation code on the lower right hand side of the first page of your after visit summary. The main way of communication is by Lockstreamhart rather than phone lines, so if you have not signed up, I recommend for you to do so. Lockstreamhart is also the way that you can review your labs and testing. If you do not hear back from us regarding testing you have had, it should be considered normal or within normal range. If you have any questions about the results, you are free to message us. We will communicate via phone call for testing updates if you are not signed up for Lockstreamhart. NovaShuntt is meant for simple questions regarding medications, possible side effects, or other simple straight forward questions in limited sentences, rather than multiple paragraphs of discussion. Saqina is not meant for, or efficient for these complex questions, extensive questions, extensive medication adjustments, complex new symptoms or concerns. These issues beyond simple questions require a follow up visit with myself. Refills:  Please pay attention to when your refills will need to be renewed.  Due to the volume of phone calls daily, this could potentially take a few days, although we certainly try to honor your refill requests as soon as we can.  You should call at least 1 week in advance of needing a refill to ensure you do not run out of medication. Keep in mind that refill requests on Fridays may not be filled until the following week.

## 2023-08-09 LAB
CERULOPLASMIN SERPL-MCNC: 23.2 MG/DL (ref 20–60)
DSDNA IGG SERPL IA-ACNC: 3.6 IU/ML
ENA AB SER QL IA: 0.1 UG/L
ENA AB SER QL IA: NEGATIVE

## 2023-08-10 LAB
VIT E ALPHA TOCO: 12.2 MG/L
VIT E GAMMA TOCO: 1.3 MG/L
VITAMIN B1 WHOLE BLD: 121.4 NMOL/L

## 2023-08-14 LAB
METHYLMALONIC ACID: 79 NMOL/L
VITAMIN B6: 18.5 UG/L

## 2023-08-24 ENCOUNTER — HOSPITAL ENCOUNTER (OUTPATIENT)
Dept: MRI IMAGING | Age: 43
Discharge: HOME | End: 2023-08-24
Attending: Other
Payer: COMMERCIAL

## 2023-08-24 DIAGNOSIS — R20.2 PARESTHESIAS: ICD-10-CM

## 2023-08-24 PROCEDURE — A9575 INJ GADOTERATE MEGLUMI 0.1ML: HCPCS | Performed by: OTHER

## 2023-08-24 PROCEDURE — 70553 MRI BRAIN STEM W/O & W/DYE: CPT | Performed by: OTHER

## 2023-08-24 RX ORDER — GADOTERATE MEGLUMINE 376.9 MG/ML
15 INJECTION INTRAVENOUS
Status: COMPLETED | OUTPATIENT
Start: 2023-08-24 | End: 2023-08-24

## 2023-08-24 RX ADMIN — GADOTERATE MEGLUMINE 15 ML: 376.9 INJECTION INTRAVENOUS at 17:28:00

## 2023-08-30 DIAGNOSIS — M47.12 CERVICAL SPONDYLOSIS WITH MYELOPATHY: Primary | ICD-10-CM

## 2023-08-31 ENCOUNTER — HOSPITAL ENCOUNTER (OUTPATIENT)
Dept: MRI IMAGING | Facility: HOSPITAL | Age: 43
Discharge: HOME | End: 2023-08-31
Attending: Other
Payer: COMMERCIAL

## 2023-08-31 DIAGNOSIS — R20.2 PARESTHESIAS: ICD-10-CM

## 2023-08-31 PROCEDURE — A9575 INJ GADOTERATE MEGLUMI 0.1ML: HCPCS | Performed by: OTHER

## 2023-08-31 PROCEDURE — 72156 MRI NECK SPINE W/O & W/DYE: CPT | Performed by: OTHER

## 2023-08-31 RX ORDER — GADOTERATE MEGLUMINE 376.9 MG/ML
15 INJECTION INTRAVENOUS
Status: COMPLETED | OUTPATIENT
Start: 2023-08-31 | End: 2023-08-31

## 2023-08-31 RX ADMIN — GADOTERATE MEGLUMINE 15 ML: 376.9 INJECTION INTRAVENOUS at 13:07:00

## 2023-09-07 ENCOUNTER — OFFICE VISIT (OUTPATIENT)
Dept: PHYSICAL MEDICINE AND REHAB | Facility: CLINIC | Age: 43
End: 2023-09-07
Payer: COMMERCIAL

## 2023-09-07 VITALS — BODY MASS INDEX: 25 KG/M2 | OXYGEN SATURATION: 100 % | HEART RATE: 67 BPM | WEIGHT: 155 LBS

## 2023-09-07 DIAGNOSIS — M54.12 CERVICAL RADICULITIS: Primary | ICD-10-CM

## 2023-09-07 PROCEDURE — 99204 OFFICE O/P NEW MOD 45 MIN: CPT | Performed by: PHYSICAL MEDICINE & REHABILITATION

## 2023-09-07 RX ORDER — METHYLPREDNISOLONE 4 MG/1
TABLET ORAL
Qty: 1 EACH | Refills: 0 | Status: SHIPPED | OUTPATIENT
Start: 2023-09-07

## 2023-09-08 NOTE — H&P
[facility], Norma Rodriguez    History and Physical    Cathleen Carter Patient Status:  No patient class for patient encounter    1980 MRN [id number]   Location [facility] [address]  Bari Bullard, Norma Saxena Attending No att. providers found   Hosp Day # 0 PCP Anthony Douglas, DO     Date:  2023    History provided by:patient  HPI:   Patient presents with:  New Patient: New R handed patient is here for neck pain. Denies injury. Pain began about 4 months ago. MRI 23. No PT or injections. Tingling in finger tips. Pain radiates to R shoulder. Feels \"shocking sensation\" in both arms with certain movements. Ibuprofen prn almost daily. Pain 2/10. Burning sensation in R posterior shoulder. Weakness in R arm. 41year old female presents with bilateral arm pain, tingling. Symptoms began about 4 months ago with tingling iin the fingertips, and then a shocking sensation into both arms with movement, particularly when jumping up and down. Over the past month she has been having left posterior shoulder pain as well. No neck pain. No previous shoulder or neck disorders. She exercises regularly, and while she has not been dropping objects she feels a noticeable difference in strength in the arms. She reports no abnormal balance. 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 Has had an MRI of the cervical spine. Seen by neurology, referred to both this clinic as well as neurosurgery. No medications. No physical therapy. No history of cervical spine injections or surgeries.        History     Past Medical History:   Diagnosis Date    Anxiety 2019    Dermatitis      Past Surgical History:   Procedure Laterality Date    MYRINGOTOMY, LASER-ASSISTED        11 and 3/7/14     Family History   Problem Relation Age of Onset    Cancer Mother         Basal Cell Carcinoma    Lipids Mother         High Cholesterol    Other (Lung cancer) Maternal Grandmother 80 smoker    Cancer Maternal Grandmother         Lung Cancer    Lipids Maternal Grandmother         High Cholesterol    Cancer Maternal Grandfather         Basal and Squamous Cell Carcinoma    Ovarian Cancer Paternal Grandmother     Cancer Paternal Grandmother 61        Ovarian Cancer    Other (Other) Paternal Grandfather 44        was killed     Social History:  Social History     Socioeconomic History    Marital status:    Tobacco Use    Smoking status: Never    Smokeless tobacco: Never   Vaping Use    Vaping Use: Never used   Substance and Sexual Activity    Alcohol use: Yes     Alcohol/week: 0.0 standard drinks of alcohol     Comment: 12 drinks a week    Drug use: No     Allergies/Medications: Allergies: No Known Allergies    Review of Systems:   Patient-reported ROS  Constitutional  Sleep Disturbance: admits  Chills: denies  Fever: denies  Weight Gain: denies  Weight Loss: denies   Cardiovascular  Chest Pain: denies  Irregular Heartbeat: denies   Respiratory  Painful Breathing: denies  Wheezing: denies   Gastrointestinal  Bowel Incontinence: denies  Heartburn: denies  Abdominal Pain: denies  Blood in Stool : denies  Rectal Pain: denies   Hematology  Easy Bruising: denies  Easy Bleeding: denies   Genitourinary  Difficulty Urinating: denies  Bladder Incontinence: denies  Pelvic Pain: denies  Painful Urination: denies   Musculoskeletal  Joint Stiffness: denies  Painful Joints: denies  Tailbone Pain: denies  Swollen Joints: denies   Peripheral Vascular  Swelling of Legs/Feet: denies  Cold Extremities: denies   Skin  Open Sores: denies  Nodules or Lumps: denies  Rash: denies   Neurological  Loss of Strength Since last Visit: admits  Tingling/Numbness: admits  Balance: denies   Psychiatric  Anxiety: admits  Depressed Mood: denies   Physical Exam:   Vital Signs:  Pulse 67, weight 155 lb (70.3 kg), SpO2 100 %. Physical Exam  Vitals and nursing note reviewed. Constitutional:       Appearance: Normal appearance. HENT:      Head: Normocephalic and atraumatic. Eyes:      Conjunctiva/sclera: Conjunctivae normal.   Pulmonary:      Effort: Pulmonary effort is normal.   Musculoskeletal:      Comments: No neck rash  Cervical extension 50 degrees. Cervical flexion 50 degrees. Cervical rotation to the right 80 degrees. Cervical rotation to the left 80 degreess  Spurling's test negative b/l  5/5 strength in shoulder abduction, elbow flexion, elbow extension, wrist extension, finger flexion, FDI bilaterally  SILT b/l UE C5-T1 distributions  2/4 biceps, brachioradialis, triceps reflexes b/l  Antunez test negative   Skin:     General: Skin is warm and dry. Neurological:      Mental Status: She is alert. Sensory: No sensory deficit. Motor: No weakness. Results:   MRI cervical spine dated 8/31/2023 independently reviewed with patient, as was report. There is mild-moderate central stenosis at C3-4. No abnormal cord signal; cord appears questionably slightly atrophied. Assessment/Plan:   1) ? Cervical myelopathy    No abnormal cord signal, though the cord appears perhaps slightly atrophied. She appears to have no strength deficits on exam today, no upper motor neuron signs, and no abnormal balance/gait. Recommend she see neurosurgery for their input, and if no surgery is recommended then she should proceed with physical therapy for cervical stabilization and to address her posture. In the meantime she was prescribed a medrol dosepack.      Lakisha Mcghee DO  9/7/2023

## 2023-09-25 ENCOUNTER — PATIENT MESSAGE (OUTPATIENT)
Dept: NEUROLOGY | Facility: CLINIC | Age: 43
End: 2023-09-25

## 2023-09-25 NOTE — TELEPHONE ENCOUNTER
From: Sola Kate  To: Donarusty Arroyo  Sent: 9/25/2023 1:22 PM CDT  Subject: Follow-up appointment on 27th    Hi Dr. Gayle Christianson,    Based on my MRI results, is a follow-up appointment with you needed? We scheduled a 6-week follow-up appointment, which is currently scheduled for wednesday the 27th, but since you referred me to spine medicine and spine surgeon, I wasn't sure if we needed the 6-week follow up appointment or not.      Thanks,  Cassi Martinez

## 2023-09-25 NOTE — TELEPHONE ENCOUNTER
Dr. Kacey Mg, please review and advise if the appointment should be kept. Thanks. Reviewed and electronically signed by:  [address] [facility], [address] [place] [address], Critical access hospital

## 2023-09-27 ENCOUNTER — LAB ENCOUNTER (OUTPATIENT)
Dept: LAB | Facility: HOSPITAL | Age: 43
End: 2023-09-27
Attending: Other
Payer: COMMERCIAL

## 2023-09-27 ENCOUNTER — OFFICE VISIT (OUTPATIENT)
Dept: NEUROLOGY | Facility: CLINIC | Age: 43
End: 2023-09-27
Payer: COMMERCIAL

## 2023-09-27 VITALS — HEIGHT: 66 IN | WEIGHT: 155 LBS | BODY MASS INDEX: 24.91 KG/M2

## 2023-09-27 DIAGNOSIS — E55.9 VITAMIN D DEFICIENCY: Primary | ICD-10-CM

## 2023-09-27 DIAGNOSIS — M47.22 CERVICAL SPONDYLOSIS WITH RADICULOPATHY: ICD-10-CM

## 2023-09-27 DIAGNOSIS — E55.9 VITAMIN D DEFICIENCY: ICD-10-CM

## 2023-09-27 LAB — VIT D+METAB SERPL-MCNC: 51.8 NG/ML (ref 30–100)

## 2023-09-27 PROCEDURE — 99214 OFFICE O/P EST MOD 30 MIN: CPT | Performed by: OTHER

## 2023-09-27 PROCEDURE — 82306 VITAMIN D 25 HYDROXY: CPT | Performed by: OTHER

## 2023-09-27 PROCEDURE — 3008F BODY MASS INDEX DOCD: CPT | Performed by: OTHER

## 2024-01-16 ENCOUNTER — HOSPITAL ENCOUNTER (EMERGENCY)
Facility: HOSPITAL | Age: 44
Discharge: HOME | End: 2024-01-16
Attending: EMERGENCY MEDICINE
Payer: COMMERCIAL

## 2024-01-16 ENCOUNTER — APPOINTMENT (OUTPATIENT)
Dept: CT IMAGING | Facility: HOSPITAL | Age: 44
End: 2024-01-16
Attending: EMERGENCY MEDICINE
Payer: COMMERCIAL

## 2024-01-16 VITALS
WEIGHT: 155 LBS | BODY MASS INDEX: 24.91 KG/M2 | HEART RATE: 70 BPM | OXYGEN SATURATION: 99 % | DIASTOLIC BLOOD PRESSURE: 78 MMHG | HEIGHT: 66 IN | SYSTOLIC BLOOD PRESSURE: 118 MMHG | RESPIRATION RATE: 18 BRPM | TEMPERATURE: 98 F

## 2024-01-16 DIAGNOSIS — R51.9 ACUTE NONINTRACTABLE HEADACHE, UNSPECIFIED HEADACHE TYPE: Primary | ICD-10-CM

## 2024-01-16 PROCEDURE — 99284 EMERGENCY DEPT VISIT MOD MDM: CPT

## 2024-01-16 PROCEDURE — 70450 CT HEAD/BRAIN W/O DYE: CPT | Performed by: EMERGENCY MEDICINE

## 2024-01-17 NOTE — ED PROVIDER NOTES
Patient Seen in: [facility] Emergency Department    History     Chief Complaint   Patient presents with    Headache       HPI    The patient presents to the ED complaining of a sudden onset headache while she was working out around 6 PM.  She states that headache starts at the base of her skull and then radiates to the front of her head.  She took a 10 mg of Motrin with some relief and now presents to the ED due to this atypical headache.  She states history of headaches in the past but never like her current.  No other complaints.    History reviewed.   Past Medical History:   Diagnosis Date    Anxiety     Dermatitis        History reviewed.   Past Surgical History:   Procedure Laterality Date    MYRINGOTOMY, LASER-ASSISTED        11 and 3/7/14         Medications :  (Not in a hospital admission)       Family History   Problem Relation Age of Onset    Cancer Mother         Basal Cell Carcinoma    Lipids Mother         High Cholesterol    Other (Lung cancer) Maternal Grandmother 90        smoker    Cancer Maternal Grandmother         Lung Cancer    Lipids Maternal Grandmother         High Cholesterol    Cancer Maternal Grandfather         Basal and Squamous Cell Carcinoma    Ovarian Cancer Paternal Grandmother     Cancer Paternal Grandmother 60        Ovarian Cancer    Other (Other) Paternal Grandfather 39        was killed       Smoking Status:   Social History     Socioeconomic History    Marital status:    Tobacco Use    Smoking status: Never    Smokeless tobacco: Never   Vaping Use    Vaping Use: Never used   Substance and Sexual Activity    Alcohol use: Yes     Alcohol/week: 0.0 standard drinks of alcohol     Comment: 12 drinks a week    Drug use: No       Constitutional and vital signs reviewed.      Social History and Family History elements reviewed from today, pertinent positives to the presenting problem noted.    Physical Exam     ED Triage Vitals [24 1925]   BP  143/72   Pulse 79   Resp 20   Temp 97.7 °F (36.5 °C)   Temp src Temporal   SpO2 100 %   O2 Device None (Room air)       All measures to prevent infection transmission during my interaction with the patient were taken. The patient was already wearing a droplet mask on my arrival to the room. Personal protective equipment was worn throughout the duration of the exam.  Handwashing was performed prior to and after the exam.  Stethoscope and any equipment used during my examination was cleaned with super sani-cloth germicidal wipes following the exam.     Physical Exam  Vitals and nursing note reviewed.   Constitutional:       General: She is not in acute distress.     Appearance: Normal appearance. She is well-developed. She is not ill-appearing or toxic-appearing.   HENT:      Head: Normocephalic and atraumatic.   Eyes:      General:         Right eye: No discharge.         Left eye: No discharge.      Conjunctiva/sclera: Conjunctivae normal.   Neck:      Trachea: No tracheal deviation.   Cardiovascular:      Rate and Rhythm: Normal rate.   Pulmonary:      Effort: Pulmonary effort is normal. No respiratory distress.   Musculoskeletal:         General: No deformity.      Cervical back: Neck supple. No rigidity.   Skin:     General: Skin is warm and dry.   Neurological:      General: No focal deficit present.      Mental Status: She is alert and oriented to person, place, and time.   Psychiatric:         Mood and Affect: Mood normal.         Behavior: Behavior normal.         ED Course      Labs Reviewed - No data to display    As Interpreted by me    Imaging Results Available and Reviewed while in ED: CT BRAIN OR HEAD (96509)    Result Date: 1/16/2024  CONCLUSION: Normal examination.     Dictated by (CST): Chito Chamberlain MD on 1/16/2024 at 9:34 PM     Finalized by (CST): Chito Chamberlain MD on 1/16/2024 at 9:35 PM         ED Medications Administered: Medications - No data to display      [facility]     Vitals:    01/16/24 1925  01/16/24 1941 01/16/24 2018   BP: 143/72 149/89 118/78   Pulse: 79 74 70   Resp: 20 18 18   Temp: 97.7 °F (36.5 °C)     TempSrc: Temporal     SpO2: 100% 100% 99%   Weight: 70.3 kg     Height: 167.6 cm (5' 6\")       *I personally reviewed and interpreted all ED vitals.    Pulse Ox: 99%, Room air, Normal       Differential Diagnosis/ Diagnostic Considerations: Specific acute headache, ICH, subarachnoid hemorrhage, other    Complicating Factors: The patient already has does not have a problem list on file. to contribute to the complexity of this ED evaluation.    Medical Decision Making  The patient presents to the ED with a sudden onset severe headache.  No history of similar headaches in the past and concern for possible ICH.  CT obtained within 2 hours of headache onset and shows no acute abnormality.  No concern for ICH or bleed at this time.  Patient reassured and stable for discharge with outpatient follow-up.    Problems Addressed:  Acute nonintractable headache, unspecified headache type: acute illness or injury that poses a threat to life or bodily functions    Amount and/or Complexity of Data Reviewed  Radiology: ordered and independent interpretation performed. Decision-making details documented in ED Course.     Details: Personally reviewed the patient's CT brain images and noted no ICH        Condition upon leaving the department: Stable    Disposition and Plan     Clinical Impression:  1. Acute nonintractable headache, unspecified headache type        Disposition:  Discharge    Follow-up:  Bhakti Diego DO  [address] [place] [id number]  [phone]    Schedule an appointment as soon as possible for a visit in 3 day(s)        Medications Prescribed:  Discharge Medication List as of 1/16/2024  9:50 PM

## 2024-05-28 ENCOUNTER — PATIENT MESSAGE (OUTPATIENT)
Dept: INTERNAL MEDICINE CLINIC | Facility: CLINIC | Age: 44
End: 2024-05-28

## 2024-05-28 NOTE — TELEPHONE ENCOUNTER
From: Rosina Bacon  To: Bhakti Diego  Sent: 5/28/2024 12:30 PM CDT  Subject: Shoulder Pain    Hi Dr. Diego,    My right shoulder has been hurting for several weeks, which is affecting range of motion and strength. Should I schedule an appointment with you or is there an orthopedic doctor you would recommend?    Thanks!  Rosina

## 2024-08-06 ENCOUNTER — HOSPITAL ENCOUNTER (OUTPATIENT)
Dept: MAMMOGRAPHY | Facility: HOSPITAL | Age: 44
Discharge: HOME | End: 2024-08-06
Attending: OBSTETRICS & GYNECOLOGY
Payer: COMMERCIAL

## 2024-08-06 DIAGNOSIS — Z12.31 ENCOUNTER FOR SCREENING MAMMOGRAM FOR MALIGNANT NEOPLASM OF BREAST: ICD-10-CM

## 2024-08-06 PROCEDURE — 77067 SCR MAMMO BI INCL CAD: CPT | Performed by: OBSTETRICS & GYNECOLOGY

## 2024-08-06 PROCEDURE — 77063 BREAST TOMOSYNTHESIS BI: CPT | Performed by: OBSTETRICS & GYNECOLOGY

## 2024-09-19 ENCOUNTER — OFFICE VISIT (OUTPATIENT)
Dept: ORTHOPEDICS CLINIC | Facility: CLINIC | Age: 44
End: 2024-09-19
Payer: COMMERCIAL

## 2024-09-19 ENCOUNTER — HOSPITAL ENCOUNTER (OUTPATIENT)
Dept: GENERAL RADIOLOGY | Facility: HOSPITAL | Age: 44
Discharge: HOME | End: 2024-09-19
Attending: ORTHOPAEDIC SURGERY
Payer: COMMERCIAL

## 2024-09-19 DIAGNOSIS — M75.81 TENDINITIS OF RIGHT ROTATOR CUFF: Primary | ICD-10-CM

## 2024-09-19 DIAGNOSIS — M25.511 RIGHT SHOULDER PAIN, UNSPECIFIED CHRONICITY: ICD-10-CM

## 2024-09-19 PROCEDURE — 99244 OFF/OP CNSLTJ NEW/EST MOD 40: CPT | Performed by: ORTHOPAEDIC SURGERY

## 2024-09-19 PROCEDURE — 73030 X-RAY EXAM OF SHOULDER: CPT | Performed by: ORTHOPAEDIC SURGERY

## 2024-09-19 NOTE — PROGRESS NOTES
NURSING INTAKE COMMENTS:   Chief Complaint   Patient presents with    Shoulder Pain     Consult right shoulder pain 2-3 on certain movements. Onset  6 months ago, possible cause playing bowling.        HPI: This 44 year old female presents today with complaints of right shoulder pain.  She thinks she may have strained the shoulder while bowling about 6 months ago.  She was trying to rotate the ball and forcefully internally rotating the shoulder while throwing the the bowling ball.  She is right-hand dominant.  She works at a computer.  There is no specific injury.  She reports occasional clicking and overhead pain.  No significant night pain.  Jorde of the pain is of the posterior shoulder.  No neck pain.  She does have occasional numbness in the fingers.  She saw a neurologist who diagnosed cervical nerve impingement.  She occasionally does weight training.  She also does biking.  She takes no medications for the problem    Past Medical History:    Anxiety    Dermatitis     Past Surgical History:   Procedure Laterality Date    Myringotomy, laser-assisted        11 and 3/7/14     Current Outpatient Medications   Medication Sig Dispense Refill    Cholecalciferol (VITAMIN D3) 25 MCG (1000 UT) Oral Cap Take 1 tablet by mouth daily.      cyanocobalamin 1000 MCG/ML Injection Solution 1000mcg weekly x 4 weeks, and then monthly. 16 mL 0    Syringe 25G X 1\" 3 ML Does not apply Misc Injection weekly for 4 weeks and then monthly 50 each 0    Pimecrolimus (ELIDEL) 1 % External Cream Apply a thin layer to face NIGHTLY 60 g 1    Cyanocobalamin (B-12) 1000 MCG Oral Cap Take 1 capsule by mouth daily. 90 capsule 1    Fluocinonide 0.05 % External Ointment Apply to AA BID 30 g 1     No Known Allergies  Family History   Problem Relation Age of Onset    Cancer Mother         Basal Cell Carcinoma    Lipids Mother         High Cholesterol    Other (Lung cancer) Maternal Grandmother 90        smoker    Cancer Maternal  Grandmother         Lung Cancer    Lipids Maternal Grandmother         High Cholesterol    Cancer Maternal Grandfather         Basal and Squamous Cell Carcinoma    Ovarian Cancer Paternal Grandmother 60        60ish    Cancer Paternal Grandmother 60        Ovarian Cancer    Other (Other) Paternal Grandfather 39        was killed       Social History     Occupational History    Not on file   Tobacco Use    Smoking status: Never    Smokeless tobacco: Never   Vaping Use    Vaping status: Never Used   Substance and Sexual Activity    Alcohol use: Yes     Alcohol/week: 0.0 standard drinks of alcohol     Comment: 12 drinks a week    Drug use: No    Sexual activity: Not on file        Review of Systems:  GENERAL: denies fevers, chills, night sweats, fatigue, unintentional weight loss/gain  SKIN: denies skin lesions, open sores, rash  HEENT:denies recent vision change, new nasal congestion,hearing loss, tinnitus, sore throat, headaches  RESPIRATORY: denies new shortness of breath, cough, asthma, wheezing  CARDIOVASCULAR: denies chest pain, leg cramps with exertion, palpitations, leg swelling  GI: denies abdominal pain, nausea, vomiting, diarrhea, constipation, hematochezia, worsening heartburn or stomach ulcers  : denies dysuria, hematuria, incontinence, increased frequency, urgency, difficulty urinating  MUSCULOSKELETAL: denies musculoskeletal complaints other than in HPI  NEURO: denies numbness, tingling, weakness, balance issues, dizziness, memory loss  PSYCHIATRIC: denies Hx of depression, anxiety, other psychiatric disorders  HEMATOLOGIC: denies blood clots, anemia, blood clotting disorders, blood transfusion  ENDOCRINE: denies autoimmune disease, thyroid issues, or diabetes  ALLERGY: denies asthma, seasonal allergies    Physical Examination:    LMP 07/23/2024 (Approximate)   Constitutional: appears well hydrated, alert and responsive, no acute distress noted  Extremities: Cervical spine range of motion slightly  restricted in extension lateral bending.  Spurling sign negative.    Semination of the right shoulder reveals no obvious atrophy no prominence.  Active forward flexion 160 degrees.  Active external rotation 70 degrees.  Passive internal rotation 80 degrees without pain.  Abduction external rotation and belly press strength are 5 out of 5.  Juárez impingement sign mildly positive.  Speed's Test mildly positive.  Anterior prehension sign negative.  Crank sign negative.  Minimal tenderness at the posterior glenohumeral joint line.  Neurological: Right hand light touch and pinprick sensory exam normal. Lateral shoulder light touch and pinprick sensory examination normal.  strength,wrist extension, biceps, triceps, and shoulder abduction strength 5 out of 5. Betty sign negative. No obvious atrophy of the hand.        Imaging:   No results found.     Labs:  Lab Results   Component Value Date    WBC 7.2 05/15/2023    HGB 15.1 05/15/2023    .0 05/15/2023      Lab Results   Component Value Date     (H) 05/15/2023    BUN 12 05/15/2023    CREATSERUM 0.76 05/15/2023        Assessment and Plan:  Diagnoses and all orders for this visit:    Tendinitis of right rotator cuff  -     PHYSICAL THERAPY - INTERNAL    Right shoulder pain, unspecified chronicity  -     XR SHOULDER, COMPLETE (MIN 2 VIEWS), RIGHT (CPT=73030); Future        Assessment: Right shoulder pain, findings consistent with posterior rotator cuff strain and tendinitis    Plan: Recommend nonoperative treatment of her problem.  Advised outpatient physical therapy.  Provided referral for therapy.  Provided written instructions on home exercises for the shoulder.  Discussed activity modifications and oral anti-inflammatories.  Suggested icing.  Follow-up again in 4 to 6 weeks.  If symptoms persist, consider an MR arthrogram of the shoulder.    Follow Up: Return in about 6 weeks (around 10/31/2024).    VIDHI ORTIZ MD

## 2025-01-09 ENCOUNTER — OFFICE VISIT (OUTPATIENT)
Dept: INTERNAL MEDICINE CLINIC | Facility: CLINIC | Age: 45
End: 2025-01-09

## 2025-01-09 VITALS
DIASTOLIC BLOOD PRESSURE: 66 MMHG | BODY MASS INDEX: 24.91 KG/M2 | WEIGHT: 155 LBS | OXYGEN SATURATION: 98 % | SYSTOLIC BLOOD PRESSURE: 124 MMHG | HEART RATE: 68 BPM | TEMPERATURE: 98 F | RESPIRATION RATE: 16 BRPM | HEIGHT: 66 IN

## 2025-01-09 DIAGNOSIS — H10.13 ALLERGIC CONJUNCTIVITIS OF BOTH EYES: ICD-10-CM

## 2025-01-09 DIAGNOSIS — Z12.31 ENCOUNTER FOR SCREENING MAMMOGRAM FOR MALIGNANT NEOPLASM OF BREAST: ICD-10-CM

## 2025-01-09 DIAGNOSIS — R92.30 DENSE BREAST: ICD-10-CM

## 2025-01-09 DIAGNOSIS — G43.809 OTHER MIGRAINE WITHOUT STATUS MIGRAINOSUS, NOT INTRACTABLE: ICD-10-CM

## 2025-01-09 DIAGNOSIS — Z00.00 ANNUAL PHYSICAL EXAM: Primary | ICD-10-CM

## 2025-01-09 DIAGNOSIS — F51.01 PRIMARY INSOMNIA: ICD-10-CM

## 2025-01-09 DIAGNOSIS — E53.8 B12 DEFICIENCY: ICD-10-CM

## 2025-01-09 DIAGNOSIS — R20.2 PARESTHESIAS: ICD-10-CM

## 2025-01-09 PROCEDURE — 99396 PREV VISIT EST AGE 40-64: CPT | Performed by: INTERNAL MEDICINE

## 2025-01-09 PROCEDURE — 99213 OFFICE O/P EST LOW 20 MIN: CPT | Performed by: INTERNAL MEDICINE

## 2025-01-09 RX ORDER — OLOPATADINE HYDROCHLORIDE 2 MG/ML
1 SOLUTION/ DROPS OPHTHALMIC 2 TIMES DAILY PRN
Qty: 1 EACH | Refills: 0 | Status: SHIPPED | OUTPATIENT
Start: 2025-01-09

## 2025-01-09 RX ORDER — TACROLIMUS 1 MG/G
OINTMENT TOPICAL DAILY
COMMUNITY

## 2025-01-09 NOTE — PROGRESS NOTES
Rosina Bacon is a 44 year old female.  Chief Complaint   Patient presents with    Physical     ANNUAL PHYSICAL  Pap Smear 7/30/21, Mammogram 8/6/24  Preventative/Wellness form signed by patient.        HPI:   Rosina Bacon is a 44 year old female who presents for a complete physical exam.      PMH: h/o dermatitis (perioral, L ear)  Anxiety-never took lexapro; notes that it is in a pattern along with her periods  Shingles     Notices tingling in R arm especially when overhead activity    Migraines -intermitttently; hasn't had anything recently    Excess saliva      PSH: none    NKDA    Gyne: sees Dr. Carmona  Last pap:all normal  2 children; no complications.     1/2014 last tetanus. Up to date on childhood vaccinations. Did have chickenpox as a child.       TODAY:  -Home renovation project; is not living at home; wakes up with red eyes; sometimes feels scratchy.    -every once in a while-has a cough; deep into the chest    -has some tingling in fingertips    -insomnia-taking magnesium; still occasionally wakes up at 3-4am    -notes variable HR 40s-180s (dad recently went to ER--age 68, noted to have collapsed lung and CAD)              Wt Readings from Last 6 Encounters:   01/09/25 155 lb (70.3 kg)   01/16/24 155 lb (70.3 kg)   09/27/23 155 lb (70.3 kg)   09/07/23 155 lb (70.3 kg)   08/08/23 155 lb (70.3 kg)   05/15/23 155 lb (70.3 kg)     Body mass index is 25.02 kg/m².       Current Outpatient Medications   Medication Sig Dispense Refill    tacrolimus 0.1 % External Ointment Apply topically daily.      Cholecalciferol (VITAMIN D3) 25 MCG (1000 UT) Oral Cap Take 1 tablet by mouth daily.      Syringe 25G X 1\" 3 ML Does not apply Misc Injection weekly for 4 weeks and then monthly 50 each 0    Pimecrolimus (ELIDEL) 1 % External Cream Apply a thin layer to face NIGHTLY 60 g 1    cyanocobalamin 1000 MCG/ML Injection Solution 1000mcg weekly x 4 weeks, and then monthly. (Patient not taking: Reported on  2025) 16 mL 0    Cyanocobalamin (B-12) 1000 MCG Oral Cap Take 1 capsule by mouth daily. (Patient not taking: Reported on 2025) 90 capsule 1    Fluocinonide 0.05 % External Ointment Apply to AA BID (Patient not taking: Reported on 2025) 30 g 1      Past Medical History:    Anxiety    Dermatitis      Past Surgical History:   Procedure Laterality Date    Myringotomy, laser-assisted        11 and 3/7/14      Family History   Problem Relation Age of Onset    Cancer Mother         Basal Cell Carcinoma    Lipids Mother         High Cholesterol    Other (Lung cancer) Maternal Grandmother 90        smoker    Cancer Maternal Grandmother         Lung Cancer    Lipids Maternal Grandmother         High Cholesterol    Cancer Maternal Grandfather         Basal and Squamous Cell Carcinoma    Ovarian Cancer Paternal Grandmother 60        60ish    Cancer Paternal Grandmother 60        Ovarian Cancer    Other (Other) Paternal Grandfather 39        was killed      Social History:   Social History     Socioeconomic History    Marital status:    Tobacco Use    Smoking status: Never    Smokeless tobacco: Never   Vaping Use    Vaping status: Never Used   Substance and Sexual Activity    Alcohol use: Yes     Alcohol/week: 0.0 standard drinks of alcohol     Comment: 12 drinks a week    Drug use: No          REVIEW OF SYSTEMS:   GENERAL: feels well otherwise  SKIN: denies any unusual skin lesions  EYES:denies blurred vision or double vision  HEENT: denies nasal congestion, sinus pain, ST, sore throat  LUNGS: denies shortness of breath with exertion, cough or wheezing  BREAST: denies masses or nipple discharge  CARDIOVASCULAR: denies chest pain, pressure, or palpitations  GI: denies abdominal pain, nausea, vomiting, diarrhea, constipation, hematochezia, or melena  : denies dysuria, urinary frequency, vaginal discharge, dyspareunia, heavy menses  NEURO: denies headaches. denies dizziness, focal  deficits  PSYCHE: denies anxiety or depression  EXT: denies edema  MSK: reports tingling in hand    EXAM:   /66   Pulse 68   Temp 97.9 °F (36.6 °C) (Oral)   Resp 16   Ht 5' 6\" (1.676 m)   Wt 155 lb (70.3 kg)   LMP 07/23/2024 (Approximate)   SpO2 98%   BMI 25.02 kg/m²     GENERAL: well developed, well nourished, in no apparent distress  HEENT: normal oropharynx, normal TM's  EYES: PERRLA, EOMI, conjunctivae are pink  NECK: supple, no cervical or supraclavicular LAD, no carotic bruits, no thyromegaly  BREAST: no dominant or suspicious mass, no axillary LAD  LUNGS: clear to auscultation  CARDIO: RRR, normal S1S2, no gallops or murmurs  GI: soft, NT, ND, NABS, no HSM  GYNE:deferred  EXTREMITIES: no cyanosis, clubbing or edema, +2 radial and DP pulses bilaterally  NEURO: A&O x 3, moves all 4 extremities spontaneously      ASSESSMENT AND PLAN:     1. Annual physical exam  utd on vaccinations; pap per gyne (last 2/2024)  Advised fasting blood work  Mammogram due 8/2025    2. Perioral dermatitis  Follows with dermatology    3. +RF  Pt has no symptoms; noted on annual blood work through employer    4. b12 deficiency  Check levels    5. Migraines  Improved    6. Paresthesias  Saw neurology dr. 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 Allergic conjunctivitis  Pataday eye drops bid prn  Zyrtec or allegra once daily    8. Insomnia  Ok to take up to 500mg of magnesium glycinate; can also add melatonin; call if still not improving    rtc in 1 yr or as needed    Bhakti Diego,

## 2025-05-07 ENCOUNTER — PATIENT MESSAGE (OUTPATIENT)
Dept: INTERNAL MEDICINE CLINIC | Facility: CLINIC | Age: 45
End: 2025-05-07

## 2025-05-07 DIAGNOSIS — Z00.00 ANNUAL PHYSICAL EXAM: ICD-10-CM

## 2025-05-07 DIAGNOSIS — E53.8 B12 DEFICIENCY: Primary | ICD-10-CM

## 2025-08-08 ENCOUNTER — HOSPITAL ENCOUNTER (OUTPATIENT)
Dept: MAMMOGRAPHY | Facility: HOSPITAL | Age: 45
Discharge: HOME | End: 2025-08-08
Attending: INTERNAL MEDICINE

## 2025-08-08 DIAGNOSIS — R92.30 DENSE BREAST: ICD-10-CM

## 2025-08-08 DIAGNOSIS — Z12.31 ENCOUNTER FOR SCREENING MAMMOGRAM FOR MALIGNANT NEOPLASM OF BREAST: ICD-10-CM

## 2025-08-08 PROCEDURE — 77063 BREAST TOMOSYNTHESIS BI: CPT | Performed by: INTERNAL MEDICINE

## 2025-08-08 PROCEDURE — 77067 SCR MAMMO BI INCL CAD: CPT | Performed by: INTERNAL MEDICINE

## (undated) NOTE — LETTER
[facility] [address] , [place]   Date:   9/7/2023     Name:   Donna Parikh    YOB: 1980   MRN:   [id number]       [facility]? Brian the areas on your body where you feel the described sensations. Use the appropriate symbol. Alfredo Macedo the areas of radiation. Include all affected areas. Just to complete the picture, please draw in the face. ACHE:  ^ ^ ^   NUMBNESS:  0000   PINS & NEEDLES:  = = = =                              ^ ^ ^                       0000              = = = =                                    ^ ^ ^                       0000            = = = =      BURNING:  XXXX   STABBING: ////                  XXXX                ////                         XXXX          ////     Please brian the line below indicating your degree of pain right now  with 0 being no pain 10 being the worst pain possible.                                          0             1             2              3             4              5              6              7             8             9             10         Patient Signature: